# Patient Record
Sex: MALE | Race: WHITE | HISPANIC OR LATINO | Employment: FULL TIME | URBAN - METROPOLITAN AREA
[De-identification: names, ages, dates, MRNs, and addresses within clinical notes are randomized per-mention and may not be internally consistent; named-entity substitution may affect disease eponyms.]

---

## 2019-07-10 ENCOUNTER — NON-PROVIDER VISIT (OUTPATIENT)
Dept: URGENT CARE | Facility: PHYSICIAN GROUP | Age: 29
End: 2019-07-10

## 2019-07-10 DIAGNOSIS — Z02.1 PRE-EMPLOYMENT DRUG SCREENING: ICD-10-CM

## 2019-07-10 LAB
AMP AMPHETAMINE: NEGATIVE
COC COCAINE: NEGATIVE
INT CON NEG: NEGATIVE
INT CON POS: POSITIVE
MET METHAMPHETAMINES: NEGATIVE
OPI OPIATES: NEGATIVE
PCP PHENCYCLIDINE: NEGATIVE
POC DRUG COMMENT 753798-OCCUPATIONAL HEALTH: NEGATIVE
THC: NEGATIVE

## 2019-07-10 PROCEDURE — 80305 DRUG TEST PRSMV DIR OPT OBS: CPT | Performed by: NURSE PRACTITIONER

## 2019-10-08 ENCOUNTER — NON-PROVIDER VISIT (OUTPATIENT)
Dept: OCCUPATIONAL MEDICINE | Facility: CLINIC | Age: 29
End: 2019-10-08

## 2019-10-08 DIAGNOSIS — Z02.1 PRE-EMPLOYMENT DRUG SCREENING: ICD-10-CM

## 2019-10-08 PROCEDURE — 8907 PR URINE COLLECT ONLY: Performed by: NURSE PRACTITIONER

## 2019-10-11 ENCOUNTER — OFFICE VISIT (OUTPATIENT)
Dept: OCCUPATIONAL MEDICINE | Facility: CLINIC | Age: 29
End: 2019-10-11

## 2019-10-11 DIAGNOSIS — Z02.1 PRE-EMPLOYMENT HEALTH SCREENING EXAMINATION: ICD-10-CM

## 2019-10-11 PROCEDURE — 8915 PR COMPREHENSIVE PHYSICAL: Performed by: NURSE PRACTITIONER

## 2020-02-13 ENCOUNTER — NON-PROVIDER VISIT (OUTPATIENT)
Dept: URGENT CARE | Facility: PHYSICIAN GROUP | Age: 30
End: 2020-02-13

## 2020-02-13 DIAGNOSIS — Z02.1 PRE-EMPLOYMENT DRUG SCREENING: ICD-10-CM

## 2020-02-13 LAB
AMP AMPHETAMINE: NEGATIVE
COC COCAINE: NEGATIVE
INT CON NEG: NORMAL
INT CON POS: NORMAL
MET METHAMPHETAMINES: NEGATIVE
OPI OPIATES: NEGATIVE
PCP PHENCYCLIDINE: NEGATIVE
POC DRUG COMMENT 753798-OCCUPATIONAL HEALTH: NEGATIVE
THC: NEGATIVE

## 2020-02-13 PROCEDURE — 80305 DRUG TEST PRSMV DIR OPT OBS: CPT | Performed by: NURSE PRACTITIONER

## 2025-03-28 ENCOUNTER — OFFICE VISIT (OUTPATIENT)
Dept: URGENT CARE | Facility: PHYSICIAN GROUP | Age: 35
End: 2025-03-28
Payer: COMMERCIAL

## 2025-03-28 ENCOUNTER — HOSPITAL ENCOUNTER (OUTPATIENT)
Dept: LAB | Facility: MEDICAL CENTER | Age: 35
End: 2025-03-28
Attending: NURSE PRACTITIONER
Payer: COMMERCIAL

## 2025-03-28 VITALS
BODY MASS INDEX: 44.52 KG/M2 | WEIGHT: 277 LBS | HEART RATE: 78 BPM | RESPIRATION RATE: 16 BRPM | HEIGHT: 66 IN | TEMPERATURE: 98.2 F | DIASTOLIC BLOOD PRESSURE: 78 MMHG | SYSTOLIC BLOOD PRESSURE: 108 MMHG | OXYGEN SATURATION: 95 %

## 2025-03-28 DIAGNOSIS — A64 STD (SEXUALLY TRANSMITTED DISEASE): ICD-10-CM

## 2025-03-28 LAB
HAV IGM SERPL QL IA: NORMAL
HBV CORE IGM SER QL: NORMAL
HBV SURFACE AG SER QL: NORMAL
HCV AB SER QL: NORMAL
HIV 1+2 AB+HIV1 P24 AG SERPL QL IA: NORMAL
T PALLIDUM AB SER QL IA: NORMAL

## 2025-03-28 PROCEDURE — 86694 HERPES SIMPLEX NES ANTBDY: CPT

## 2025-03-28 PROCEDURE — 99213 OFFICE O/P EST LOW 20 MIN: CPT | Performed by: NURSE PRACTITIONER

## 2025-03-28 PROCEDURE — 87389 HIV-1 AG W/HIV-1&-2 AB AG IA: CPT

## 2025-03-28 PROCEDURE — 86695 HERPES SIMPLEX TYPE 1 TEST: CPT

## 2025-03-28 PROCEDURE — 86696 HERPES SIMPLEX TYPE 2 TEST: CPT

## 2025-03-28 PROCEDURE — 86780 TREPONEMA PALLIDUM: CPT

## 2025-03-28 PROCEDURE — 3074F SYST BP LT 130 MM HG: CPT | Performed by: NURSE PRACTITIONER

## 2025-03-28 PROCEDURE — 36415 COLL VENOUS BLD VENIPUNCTURE: CPT

## 2025-03-28 PROCEDURE — 80074 ACUTE HEPATITIS PANEL: CPT

## 2025-03-28 PROCEDURE — 3078F DIAST BP <80 MM HG: CPT | Performed by: NURSE PRACTITIONER

## 2025-03-28 NOTE — PROGRESS NOTES
"Patient/parent/guardian has consented to treatment and for use of patient information for treatment and billing purposes.  Subjective:   Link Friend  is a 34 y.o. male who presents for Sexually Transmitted Diseases (No symptoms at this time )       HPI  34-year-old male presents with request for STD screening.  States he has a new partner and would like peace of mind.  Denies any symptoms including dysuria, penile discharge or pelvic pressure.       ROS      CURRENT MEDICATIONS:  This patient does not have an active medication from one of the medication groupers.  Allergies:   No Known Allergies  Current Problems: Link Friend does not have a problem list on file.  Past Surgical Hx:  No past surgical history on file.   Past Social Hx:      Objective:   /78 (BP Location: Left arm, Patient Position: Sitting)   Pulse 78   Temp 36.8 °C (98.2 °F) (Temporal)   Resp 16   Ht 1.676 m (5' 6\")   Wt (!) 126 kg (277 lb)   SpO2 95%   BMI 44.71 kg/m²   Physical Exam  Vitals and nursing note reviewed.   Constitutional:       Appearance: Normal appearance. He is not ill-appearing.   HENT:      Right Ear: External ear normal.      Left Ear: External ear normal.      Nose: Nose normal.      Mouth/Throat:      Mouth: Mucous membranes are moist.   Eyes:      Extraocular Movements: Extraocular movements intact.      Conjunctiva/sclera: Conjunctivae normal.      Pupils: Pupils are equal, round, and reactive to light.   Cardiovascular:      Rate and Rhythm: Normal rate.   Pulmonary:      Effort: Pulmonary effort is normal.   Musculoskeletal:         General: Normal range of motion.      Cervical back: Normal range of motion and neck supple.   Skin:     General: Skin is warm and dry.   Neurological:      General: No focal deficit present.      Mental Status: He is alert.       Assessment/Plan:   1. STD (sexually transmitted disease)  - T.PALLIDUM AB DAVID (SCREENING); Standing  - HEPATITIS PANEL " ACUTE(4 COMPONENTS); Future  - Chlamydia/GC, PCR (Urine); Future  - HIV AG/AB COMBO ASSAY SCREENING; Future  - HSV 1/2 IGG W/ TYPE SPECIFIC RFLX; Future    34-year-old male presents with request for STD testing.  Vital signs stable, afebrile.  In no acute distress, does not appear ill.  Discussed treatment options, and testing available.  Lab slip provided. Red flags, RTC and ER precautions discussed, with patient confirming their understanding of  need for immediate follow-up.  Discussed medication management options, risks and benefits, and alternatives to treatment plan agreed upon. Instructed to continue  medications without changes as ordered by primary care unless aforementioned above.  Patient expresses understanding and agrees to plan of care. All questions or concerns answered. For my MDM, I have personally reviewed previous notes, and test results as pertinent to today's visit.    Please note that this dictation was created using voice recognition software. I have made every reasonable attempt to correct obvious errors,  but there may be grammar errors, and possibly content that I did not discover before finalizing the note.   This note was electronically signed by MAGGY Kaplan

## 2025-03-28 NOTE — PATIENT INSTRUCTIONS
Preventing Sexually Transmitted Infections, Adult  Sexually transmitted infections (STIs) are spread from person to person (are contagious). They are spread, or transmitted, through bodily fluids exchanged during sex or sexual contact. These bodily fluids include saliva, semen, blood, vaginal mucus, and urine. STIs are very common and can occur among people of all ages.  Some common STIs include:  Herpes.  Hepatitis B.  Chlamydia.  Gonorrhea.  Syphilis.  HPV (human papillomavirus).  HIV, also called the human immunodeficiency virus. This is the virus that can cause AIDS (acquired immunodeficiency syndrome).  Often, people who have these STIs do not have symptoms. Even without symptoms, these infections can be spread from person to person and require treatment.  How can these conditions affect me?  STIs can be treated, and many STIs can be cured. However, some STIs cannot be cured and will affect you for the rest of your life.  Certain STIs may:  Require you to take medicine for the rest of your life.  Affect your ability to have children (your fertility).  Increase your risk for developing another STI or certain serious health conditions. These may include:  Cervical cancer.  Head and neck cancer.  Pelvic inflammatory disease (PID), in women.  Organ damage or damage to other parts of your body, if the infection spreads.  Cause problems during pregnancy and may be transmitted to the baby during the pregnancy or childbirth.  What can increase my risk?  You may have an increased risk for developing an STI if:  You have unprotected sex or sexual contact.  You have more than one sex partner.  You have a sex partner who has multiple sex partners.  You have sexual contact with someone who has an STI.  You have an STI or you had an STI before.  You inject drugs or have a sex partner or partners who inject drugs.  What actions can I take to prevent STIs?  The only way to completely prevent STIs is not to have sex of any  kind. This is called practicing abstinence. If you are sexually active, you can protect yourself and others by taking these actions to lower your risk of getting an STI:  Lifestyle  Avoid mixing alcohol, drugs, and sex. Alcohol and drug use can affect your ability to make good decisions and can lead to risky sexual behaviors.  Medicines  Ask your health care provider about taking pre-exposure prophylaxis (PrEP) to prevent HIV infection.  General information    Stay up to date on vaccinations. Certain vaccines can lower your risk of getting certain STIs, such as:  Hepatitis A and hepatitis B vaccines.  HPV (human papillomavirus) vaccine.  Have only one sex partner (be monogamous) or limit the number of sex partners you have.  Use methods that prevent the exchange of body fluids between partners (barrier protection) correctly every time you have sexual contact. Barrier protection can be used during oral, vaginal, or anal sex. Commonly used barrier methods include:  External (male) condom.  Internal (female) condom.  Dental dam.  Use a new barrier method for every sex act from start to finish.  Get tested for STIs. Have your partners get tested, too.  If you test positive for an STI, follow recommendations from your health care provider about treatment and make sure your sex partners are tested and treated.  Birth control pills, injections, implants, and intrauterine devices (IUDs) do not protect against STIs. To prevent both STIs and pregnancy, always use a condom with another form of birth control.  Some STIs, such as herpes, are spread through skin-to-skin contact. A barrier method may not protect you from getting such STIs. Avoid all sexual contact if you or your partners have herpes and there is an active flare with open sores.  Where to find more information  Learn more about STIs from:  Centers for Disease Control and Prevention:  More information about specific STIs: cdc.gov/std  Places to get sexual health  counseling and treatment for free or at a low cost: gettested.cdc.gov  [place] Department of Health and Human Services: www.womenshealth.gov  Summary  Sexually transmitted infections (STIs) can spread through exchanging bodily fluids during sexual contact. Fluids include saliva, semen, blood, vaginal mucus, and urine.  You may have an increased risk for developing an STI if you have unprotected sex.  If you do have sex, limit your number of sex partners and use barrier protection every time you have sex.  This information is not intended to replace advice given to you by your health care provider. Make sure you discuss any questions you have with your health care provider.  Document Revised: 04/17/2023 Document Reviewed: 02/01/2021  Elsevier Patient Education © 2023 [employer]

## 2025-03-29 ENCOUNTER — RESULTS FOLLOW-UP (OUTPATIENT)
Dept: URGENT CARE | Facility: CLINIC | Age: 35
End: 2025-03-29
Payer: COMMERCIAL

## 2025-03-30 LAB — HSV1+2 IGG SER IA-ACNC: >22.4 IV

## 2025-03-31 DIAGNOSIS — A64 STD (SEXUALLY TRANSMITTED DISEASE): ICD-10-CM

## 2025-03-31 LAB
HSV1 GG IGG SER-ACNC: 52.6 IV
HSV2 GG IGG SER-ACNC: 2.92 IV

## 2025-03-31 NOTE — PROGRESS NOTES
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 Discussed with patient to repeat test. Lab slip ordered, pt to complete draw this week.

## 2025-04-03 ENCOUNTER — HOSPITAL ENCOUNTER (OUTPATIENT)
Dept: LAB | Facility: MEDICAL CENTER | Age: 35
End: 2025-04-03
Attending: NURSE PRACTITIONER
Payer: COMMERCIAL

## 2025-04-03 DIAGNOSIS — A64 STD (SEXUALLY TRANSMITTED DISEASE): ICD-10-CM

## 2025-04-03 PROCEDURE — 86695 HERPES SIMPLEX TYPE 1 TEST: CPT

## 2025-04-03 PROCEDURE — 86696 HERPES SIMPLEX TYPE 2 TEST: CPT

## 2025-04-03 PROCEDURE — 36415 COLL VENOUS BLD VENIPUNCTURE: CPT

## 2025-04-03 PROCEDURE — 86694 HERPES SIMPLEX NES ANTBDY: CPT

## 2025-04-05 ENCOUNTER — RESULTS FOLLOW-UP (OUTPATIENT)
Dept: URGENT CARE | Facility: PHYSICIAN GROUP | Age: 35
End: 2025-04-05

## 2025-04-05 LAB
HSV1 GG IGG SER-ACNC: 49.8 IV
HSV1+2 IGG SER IA-ACNC: >22.4 IV
HSV2 GG IGG SER-ACNC: 3.23 IV

## 2025-04-06 DIAGNOSIS — B00.1 COLD SORE: ICD-10-CM

## 2025-04-06 RX ORDER — ACYCLOVIR 400 MG/1
800 TABLET ORAL 2 TIMES DAILY
Qty: 56 TABLET | Refills: 0 | Status: SHIPPED | OUTPATIENT
Start: 2025-04-06 | End: 2025-04-20

## 2025-04-06 NOTE — PROGRESS NOTES
Discussed repeat lab with patient. Education and reassurance provided. Rx sent. Recommending returning to [facility] for viral culture of lesion.